# Patient Record
Sex: FEMALE | NOT HISPANIC OR LATINO | Employment: UNEMPLOYED | URBAN - METROPOLITAN AREA
[De-identification: names, ages, dates, MRNs, and addresses within clinical notes are randomized per-mention and may not be internally consistent; named-entity substitution may affect disease eponyms.]

---

## 2017-01-06 ENCOUNTER — CLINICAL SUPPORT (OUTPATIENT)
Dept: OBSTETRICS AND GYNECOLOGY | Facility: CLINIC | Age: 22
End: 2017-01-06

## 2017-01-06 VITALS
BODY MASS INDEX: 24.11 KG/M2 | HEIGHT: 66 IN | DIASTOLIC BLOOD PRESSURE: 72 MMHG | HEART RATE: 76 BPM | WEIGHT: 150 LBS | SYSTOLIC BLOOD PRESSURE: 115 MMHG

## 2017-01-06 DIAGNOSIS — Z30.013 ENCOUNTER FOR INITIAL PRESCRIPTION OF INJECTABLE CONTRACEPTIVE: Primary | ICD-10-CM

## 2017-01-06 PROCEDURE — 96372 THER/PROPH/DIAG INJ SC/IM: CPT | Performed by: OBSTETRICS & GYNECOLOGY

## 2017-01-06 RX ORDER — MEDROXYPROGESTERONE ACETATE 150 MG/ML
150 INJECTION, SUSPENSION INTRAMUSCULAR ONCE
Status: COMPLETED | OUTPATIENT
Start: 2017-01-06 | End: 2017-01-06

## 2017-01-06 RX ADMIN — MEDROXYPROGESTERONE ACETATE 150 MG: 150 INJECTION, SUSPENSION INTRAMUSCULAR at 10:07

## 2017-01-06 NOTE — MR AVS SNAPSHOT
"Khushbu Crook   2017 10:00 AM   Clinical Support    Dept Phone:  [phone]   Encounter #:  [id number]    Provider:  NURSE LOB[facility]N PRES   Department:  [facility]                Your Full Care Plan              Your Updated Medication List      Notice  As of 2017 10:08 AM    You have not been prescribed any medications.            You Were Diagnosed With        Codes Comments    Encounter for initial prescription of injectable contraceptive    -  Primary ICD-10-CM: Z30.013  ICD-9-CM: V25.02       Medications to be Given to You by a Medical Professional     Due       Frequency    2017 medroxyPROGESTERone (DEPO-PROVERA) injection 150 mg  Once      Instructions     None    Patient Instructions History      Upcoming Appointments     Visit Type Date Time Department    INJECTION 2017 10:00 AM [facility]      [employer] Signup     [facility] [employer] allows you to send messages to your doctor, view your test results, renew your prescriptions, schedule appointments, and more. To sign up, go to [employer] and click on the Sign Up Now link in the New User? box. Enter your [employer] Activation Code exactly as it appears below along with the last four digits of your Social Security Number and your Date of Birth () to complete the sign-up process. If you do not sign up before the expiration date, you must request a new code.    [employer] Activation Code: [email or web address]  Expires: 2017 10:07 AM    If you have questions, you can email [email or web address] or call [phone] to talk to our [employer] staff. Remember, [employer] is NOT to be used for urgent needs. For medical emergencies, dial 911.               Other Info from Your Visit           Allergies     No Known Allergies      Vital Signs     Blood Pressure Pulse Height Weight Body Mass Index Smoking Status    115/72 (65 %/ 75 %)* 76 66\" (167.6 cm) (75 %, Z= 0.67)† 150 lb " (68 kg) (81 %, Z= 0.87)† 24.21 kg/m2 (74 %, Z= 0.66)† Never Smoker    *BP percentiles are based on NHBPEP's 4th Report    †Growth percentiles are based on CDC 2-20 Years data.      Problems and Diagnoses Noted     Counseling for birth control regarding intrauterine device (IUD)    -  Primary

## 2017-03-09 ENCOUNTER — CONSULT (OUTPATIENT)
Dept: OBSTETRICS AND GYNECOLOGY | Facility: CLINIC | Age: 22
End: 2017-03-09

## 2017-03-09 VITALS
BODY MASS INDEX: 24.91 KG/M2 | HEART RATE: 91 BPM | WEIGHT: 155 LBS | SYSTOLIC BLOOD PRESSURE: 117 MMHG | DIASTOLIC BLOOD PRESSURE: 81 MMHG | HEIGHT: 66 IN

## 2017-03-09 DIAGNOSIS — Z30.011 ENCOUNTER FOR INITIAL PRESCRIPTION OF CONTRACEPTIVE PILLS: Primary | ICD-10-CM

## 2017-03-09 PROCEDURE — 99213 OFFICE O/P EST LOW 20 MIN: CPT | Performed by: OBSTETRICS & GYNECOLOGY

## 2017-03-09 RX ORDER — NORGESTIMATE AND ETHINYL ESTRADIOL 0.25-0.035
1 KIT ORAL DAILY
Qty: 28 TABLET | Refills: 4 | Status: SHIPPED | OUTPATIENT
Start: 2017-03-09 | End: 2017-07-28 | Stop reason: SDUPTHER

## 2017-03-09 RX ORDER — MEDROXYPROGESTERONE ACETATE 150 MG/ML
150 INJECTION, SUSPENSION INTRAMUSCULAR
COMMUNITY
End: 2017-03-09 | Stop reason: SINTOL

## 2017-03-09 NOTE — PROGRESS NOTES
"Subjective   Khushbu Crook is a 21 y.o. female   CC: Wants to change birth control.  History of Present Illness  Pt here to discuss change of bc.  Patient currently uses Depo-Provera for birth control.  She had her last Depo-Provera shot on 1/5/17.  This was her third shot.  She still has irregular menses with it.  Her biggest concern with the Depo-Provera has been weight gain and increased appetite.  She is interested in starting on the birth control pill.  She is otherwise without major complaints today.    The following portions of the patient's history were reviewed and updated as appropriate: allergies, current medications, past family history, past medical history, past social history, past surgical history and problem list.    Review of Systems  General: No fever or chills  Constitutional: Pos weight gain, no hair loss  HENT: No headache, no hearing loss, no tinnitus  Eyes: normal vision, no eye pain  Lungs: No cough, no shortness of breath  Heart: No chest pain, no palpitations  Abdomen: No nausea, vomiting, constipation or diarrhea  : No dysuria, no hematuria  Skin: No rashes  Lymph: No swelling  Neuro: No parathesia, no weakness  Psych: Normal though content, no hallucinations, no SI/HI    Objective   Physical Exam  Vitals:    03/09/17 1059   BP: 117/81   Pulse: 91   Weight: 155 lb (70.3 kg)   Height: 66\" (167.6 cm)   Patient's last menstrual period was 02/28/2017 (approximate).   Gen: No acute distress, awake and oriented times three  Pelvic:  Deferred  Psych: Good judgement and insight, normal affect and mood      Assessment/Plan   Diagnoses and all orders for this visit:    Encounter for initial prescription of contraceptive pills  -     norgestimate-ethinyl estradiol (SPRINTEC 28) 0.25-35 MG-MCG per tablet; Take 1 tablet by mouth Daily.    Various contraceptive options were discussed with the patient.  I explained to the patient that any form of progesterone only contraception may be likely to cause " weight gain.  My recommendation would be to use a nonhormonal form of contraception or combined estrogen/progesterone form of contraception.  We discussed birth control pills, patches, IUD, nexplanon, Copper IUD, etc.  Educational handouts were given to the patient.  The patient would like to start on birth control pills.  Sunday start was discussed.  Instructions for use were given.  Risks, benefits, alternatives, and side effects were discussed.  We will prescribe combined oral contraceptive pills.  The patient will need to see me back in about 3 months for her annual exam.  We can also follow up on her birth control at that time.  Patient may also consider possibly starting long-acting reversible contraception, or possibly a ParaGard IUD.  Stop DMPA.    I spent 12 out of 15 minutes with the patient in face to face counseling of the above issues.

## 2017-07-28 DIAGNOSIS — Z30.011 ENCOUNTER FOR INITIAL PRESCRIPTION OF CONTRACEPTIVE PILLS: ICD-10-CM

## 2017-08-25 ENCOUNTER — OFFICE VISIT (OUTPATIENT)
Dept: OBSTETRICS AND GYNECOLOGY | Facility: CLINIC | Age: 22
End: 2017-08-25

## 2017-08-25 VITALS
HEIGHT: 66 IN | HEART RATE: 70 BPM | DIASTOLIC BLOOD PRESSURE: 78 MMHG | BODY MASS INDEX: 24.91 KG/M2 | WEIGHT: 155 LBS | SYSTOLIC BLOOD PRESSURE: 119 MMHG

## 2017-08-25 DIAGNOSIS — Z30.011 ENCOUNTER FOR INITIAL PRESCRIPTION OF CONTRACEPTIVE PILLS: ICD-10-CM

## 2017-08-25 DIAGNOSIS — Z30.41 ENCOUNTER FOR SURVEILLANCE OF CONTRACEPTIVE PILLS: Primary | ICD-10-CM

## 2017-08-25 PROCEDURE — 99212 OFFICE O/P EST SF 10 MIN: CPT | Performed by: OBSTETRICS & GYNECOLOGY

## 2017-08-25 RX ORDER — NORGESTIMATE AND ETHINYL ESTRADIOL 0.25-0.035
1 KIT ORAL DAILY
Qty: 28 TABLET | Refills: 12 | Status: SHIPPED | OUTPATIENT
Start: 2017-08-25 | End: 2018-08-02 | Stop reason: SDUPTHER

## 2018-08-02 ENCOUNTER — OFFICE VISIT (OUTPATIENT)
Dept: OBSTETRICS AND GYNECOLOGY | Facility: CLINIC | Age: 23
End: 2018-08-02

## 2018-08-02 VITALS — HEART RATE: 84 BPM | SYSTOLIC BLOOD PRESSURE: 121 MMHG | HEIGHT: 66 IN | DIASTOLIC BLOOD PRESSURE: 81 MMHG

## 2018-08-02 DIAGNOSIS — Z30.011 ENCOUNTER FOR INITIAL PRESCRIPTION OF CONTRACEPTIVE PILLS: ICD-10-CM

## 2018-08-02 DIAGNOSIS — Z01.419 ENCOUNTER FOR GYNECOLOGICAL EXAMINATION: Primary | ICD-10-CM

## 2018-08-02 DIAGNOSIS — Z11.3 SCREEN FOR STD (SEXUALLY TRANSMITTED DISEASE): ICD-10-CM

## 2018-08-02 DIAGNOSIS — Z12.4 SCREENING FOR CERVICAL CANCER: ICD-10-CM

## 2018-08-02 PROCEDURE — 99395 PREV VISIT EST AGE 18-39: CPT | Performed by: OBSTETRICS & GYNECOLOGY

## 2018-08-02 RX ORDER — NORGESTIMATE AND ETHINYL ESTRADIOL 0.25-0.035
1 KIT ORAL DAILY
Qty: 28 TABLET | Refills: 12 | Status: SHIPPED | OUTPATIENT
Start: 2018-08-02

## 2018-08-02 NOTE — PROGRESS NOTES
"Subjective   Khushbu Crook is a 23 y.o. female.   CC: Pt here for annual and std check.  History of Present Illness   Pt here for annual and std check.  She has no complaints at this time.  She is on birth control pills and has been happy with them.  She would like to continue them.  Periods are regular.  She is not a smoker.  She does not routinely perform self breast exams.  She has no family history of breast cancer.    Social History   Substance Use Topics   • Smoking status: Never Smoker   • Smokeless tobacco: Never Used   • Alcohol use No     The following portions of the patient's history were reviewed and updated as appropriate: allergies, current medications, past family history, past medical history, past social history, past surgical history and problem list.    Review of Systems  General: No fever or chills  Constitutional: No weight loss or gain, no hair loss  HENT: No headache, no hearing loss, no tinnitus  Eyes: normal vision, no eye pain  Lungs: No cough, no shortness of breath  Heart: No chest pain, no palpitations  Abdomen: No nausea, vomiting, constipation or diarrhea  : No dysuria, no hematuria  Skin: No rashes  Lymph: No swelling  Neuro: No parathesia, no weakness  Psych: Normal though content, no hallucinations, no SI/HI    Objective   Physical Exam  Vitals:    08/02/18 0822   BP: 121/81   Pulse: 84   Height: 167.6 cm (66\")   Patient's last menstrual period was 07/19/2018 (exact date).   Exam performed in the presence of a female chaperone  Patient has provided verbal consent to proceed with exam.    Gen: No acute distress, awake and oriented times three  HENT: Normocephalic, atraumatic, Moist mucous membranes  Eyes: PERRLA, EOMI  Neck: Supple, normal range of motion, no thyromegaly  Lungs: Normal work of breathing, lungs clear bilaterally, no crackles/wheezes  Heart: Regular rate and rhythm, no murmurs  Abdomen: soft, nontender, non distended, normoactive bowel sounds  Breast: Symmetrical. " No skin changes or nipple retractions. No lumps or masses bilaterally. No tenderness bilaterally.  Pelvic:   Normal external female genitalia, no lesions  Vagina: No blood or discharge  Cervix: No cervical motion tenderness, no lesions, no active bleeding, nonfriable  Uterus: Anteverted, normal size and shape, nontender  Adnexa: No masses or tenderness  Rectal: Deferred  Skin: Warm and dry, no rashes  Psych: Good judgement and insight, normal affect and mood  Neuro: CN 2-12 intact, no gross deficits    Assessment/Plan   Diagnoses and all orders for this visit:    Encounter for gynecological examination  Pap smear performed today.  Patient declines STD blood work.  Patient encouraged to perform routine self breast exams.  Return to the office in one year for annual exam, or sooner as needed.  Refills of birth control pills provided.  Screening for cervical cancer  -     IGP,CtNgTv,rfx Apt HPV All  She is instructed to call our office for the results if she has not heard them after 1 week.   Screen for STD (sexually transmitted disease)  -     IGP,CtNgTv,rfx Apt HPV All    Encounter for initial prescription of contraceptive pills  -     norgestimate-ethinyl estradiol (SPRINTEC 28) 0.25-35 MG-MCG per tablet; Take 1 tablet by mouth Daily.

## 2018-08-06 LAB
C TRACH RRNA CVX QL NAA+PROBE: NEGATIVE
CONV .: NORMAL
CYTOLOGIST CVX/VAG CYTO: NORMAL
CYTOLOGY CVX/VAG DOC THIN PREP: NORMAL
DX ICD CODE: NORMAL
HIV 1 & 2 AB SER-IMP: NORMAL
N GONORRHOEA RRNA CVX QL NAA+PROBE: NEGATIVE
OTHER STN SPEC: NORMAL
PATH REPORT.FINAL DX SPEC: NORMAL
STAT OF ADQ CVX/VAG CYTO-IMP: NORMAL
T VAGINALIS RRNA SPEC QL NAA+PROBE: NEGATIVE

## 2018-08-07 ENCOUNTER — TELEPHONE (OUTPATIENT)
Dept: OBSTETRICS AND GYNECOLOGY | Facility: CLINIC | Age: 23
End: 2018-08-07

## 2018-08-07 NOTE — TELEPHONE ENCOUNTER
----- Message from Alexis Hendricks MD sent at 8/6/2018 10:46 PM EDT -----  Notify the patient that her Pap smear was normal and her gonorrhea and chlamydia tests were negative

## 2018-08-13 ENCOUNTER — TELEPHONE (OUTPATIENT)
Dept: OBSTETRICS AND GYNECOLOGY | Facility: CLINIC | Age: 23
End: 2018-08-13

## 2018-08-13 NOTE — TELEPHONE ENCOUNTER
Pt called back about pap results. Advised pt pap was normal and gonorrhea and chlamydia test were negative. Nothing further needed.

## 2020-02-05 ENCOUNTER — TELEPHONE (OUTPATIENT)
Dept: OBSTETRICS AND GYNECOLOGY | Facility: CLINIC | Age: 25
End: 2020-02-05

## 2023-03-21 NOTE — PROGRESS NOTES
"Subjective   Khushbu Crook is a 22 y.o. female   CC: Pt here for f/u on bc.  History of Present Illness  Pt here for f/u on bc.  Last visit, the patient switch from Depo-Provera to birth control pills, because she had problems with irregular bleeding on the Depo-Provera.  The patient reports that she is been very happy on the birth control pills and like to continue this method.  Previously, she had considered a nonhormonal IUD; however, she is happy on the birth control pills and wants to stay on this.    The following portions of the patient's history were reviewed and updated as appropriate: allergies, current medications, past family history, past medical history, past social history, past surgical history and problem list.    Review of Systems  General: No fever or chills  Abdomen: No nausea, vomiting, constipation or diarrhea  : No dysuria, no hematuria  Psych: Normal though content, no hallucinations, no SI/HI    Objective   Physical Exam  Vitals:    08/25/17 1051   BP: 119/78   Pulse: 70   Weight: 155 lb (70.3 kg)   Height: 66\" (167.6 cm)   Gen: No acute distress, awake and oriented times three  Pelvic:  Deferred  Psych: Good judgement and insight, normal affect and mood      Assessment/Plan   Diagnoses and all orders for this visit:    Encounter for surveillance of contraceptive pills    Encounter for initial prescription of contraceptive pills  -     norgestimate-ethinyl estradiol (SPRINTEC 28) 0.25-35 MG-MCG per tablet; Take 1 tablet by mouth Daily.      Continue birth control pills.  1 year's worth of refills sent to the pharmacy.  Return to the office in July 2018 after the 23st birthday for an annual exam/Pap smear.  Patient may return sooner if necessary.           " Myalgia Monitoring: I explained this is common when taking isotretinoin. If this worsens they will contact us.